# Patient Record
Sex: MALE | Race: WHITE | NOT HISPANIC OR LATINO | Employment: UNEMPLOYED | URBAN - METROPOLITAN AREA
[De-identification: names, ages, dates, MRNs, and addresses within clinical notes are randomized per-mention and may not be internally consistent; named-entity substitution may affect disease eponyms.]

---

## 2017-03-27 ENCOUNTER — HOSPITAL ENCOUNTER (EMERGENCY)
Facility: MEDICAL CENTER | Age: 32
End: 2017-03-27
Attending: EMERGENCY MEDICINE
Payer: MEDICAID

## 2017-03-27 VITALS
TEMPERATURE: 98.2 F | HEART RATE: 93 BPM | WEIGHT: 156.75 LBS | BODY MASS INDEX: 24.6 KG/M2 | RESPIRATION RATE: 14 BRPM | OXYGEN SATURATION: 100 % | SYSTOLIC BLOOD PRESSURE: 140 MMHG | HEIGHT: 67 IN | DIASTOLIC BLOOD PRESSURE: 87 MMHG

## 2017-03-27 DIAGNOSIS — H10.33 ACUTE CONJUNCTIVITIS OF BOTH EYES, UNSPECIFIED ACUTE CONJUNCTIVITIS TYPE: ICD-10-CM

## 2017-03-27 PROCEDURE — 99283 EMERGENCY DEPT VISIT LOW MDM: CPT

## 2017-03-27 RX ORDER — CIPROFLOXACIN HYDROCHLORIDE 3.5 MG/ML
1 SOLUTION/ DROPS TOPICAL
Qty: 1 BOTTLE | Refills: 0 | Status: SHIPPED | OUTPATIENT
Start: 2017-03-27 | End: 2018-01-24

## 2017-03-27 ASSESSMENT — LIFESTYLE VARIABLES: DO YOU DRINK ALCOHOL: NO

## 2017-03-27 ASSESSMENT — PAIN SCALES - GENERAL: PAINLEVEL_OUTOF10: 1

## 2017-03-27 NOTE — ED AVS SNAPSHOT
Goodybag Access Code: [email or web address]  Expires: 4/26/2017  9:30 AM    Goodybag  A secure, online tool to manage your health information     [employer]’s Goodybag® is a secure, online tool that connects you to your personalized health information from the privacy of your home -- day or night - making it very easy for you to manage your healthcare. Once the activation process is completed, you can even access your medical information using the Goodybag sheela, which is available for free in the Apple Sheela store or Google Play store.     Goodybag provides the following levels of access (as shown below):   My Chart Features   [facility] Primary Care Doctor [facility]  Specialists [facility]  Urgent  Care Non-[facility]  Primary Care  Doctor   Email your healthcare team securely and privately 24/7 X X X X   Manage appointments: schedule your next appointment; view details of past/upcoming appointments X      Request prescription refills. X      View recent personal medical records, including lab and immunizations X X X X   View health record, including health history, allergies, medications X X X X   Read reports about your outpatient visits, procedures, consult and ER notes X X X X   See your discharge summary, which is a recap of your hospital and/or ER visit that includes your diagnosis, lab results, and care plan. X X       How to register for Goodybag:  1. Go to  [email or web address].  2. Click on the Sign Up Now box, which takes you to the New Member Sign Up page. You will need to provide the following information:  a. Enter your Goodybag Access Code exactly as it appears at the top of this page. (You will not need to use this code after you’ve completed the sign-up process. If you do not sign up before the expiration date, you must request a new code.)   b. Enter your date of birth.   c. Enter your home email address.   d. Click Submit, and follow the next screen’s instructions.  3. Create a Goodybag ID. This will be your Goodybag  login ID and cannot be changed, so think of one that is secure and easy to remember.  4. Create a NotesFirst password. You can change your password at any time.  5. Enter your Password Reset Question and Answer. This can be used at a later time if you forget your password.   6. Enter your e-mail address. This allows you to receive e-mail notifications when new information is available in NotesFirst.  7. Click Sign Up. You can now view your health information.    For assistance activating your NotesFirst account, call [phone]

## 2017-03-27 NOTE — ED AVS SNAPSHOT
3/27/2017          Sandeep Watson  [address]  [place] [address]    Dear Sandeep:    [facility] wants to ensure your discharge home is safe and you or your loved ones have had all your questions answered regarding your care after you leave the hospital.    You may receive a telephone call within two days of your discharge.  This call is to make certain you understand your discharge instructions as well as ensure we provided you with the best care possible during your stay with us.     The call will only last approximately 3-5 minutes and will be done by a nurse.    Once again, we want to ensure your discharge home is safe and that you have a clear understanding of any next steps in your care.  If you have any questions or concerns, please do not hesitate to contact us, we are here for you.  Thank you for choosing [facility] for your healthcare needs.    Sincerely,    Sidney Pena    [facility]

## 2017-03-27 NOTE — DISCHARGE INSTRUCTIONS
"Conjunctivitis  Conjunctivitis is commonly called \"pink eye.\" Conjunctivitis can be caused by bacterial or viral infection, allergies, or injuries. There is usually redness of the lining of the eye, itching, discomfort, and sometimes discharge. There may be deposits of matter along the eyelids. A viral infection usually causes a watery discharge, while a bacterial infection causes a yellowish, thick discharge. Pink eye is very contagious and spreads by direct contact.  You may be given antibiotic eyedrops as part of your treatment. Before using your eye medicine, remove all drainage from the eye by washing gently with warm water and cotton balls. Continue to use the medication until you have awakened 2 mornings in a row without discharge from the eye. Do not rub your eye. This increases the irritation and helps spread infection. Use separate towels from other household members. Wash your hands with soap and water before and after touching your eyes. Use cold compresses to reduce pain and sunglasses to relieve irritation from light. Do not wear contact lenses or wear eye makeup until the infection is gone.  SEEK MEDICAL CARE IF:   · Your symptoms are not better after 3 days of treatment.  · You have increased pain or trouble seeing.  · The outer eyelids become very red or swollen.  Document Released: 01/25/2006 Document Revised: 03/11/2013 Document Reviewed: 12/18/2006  Spredfast® Patient Information ©2014 [employer].    "

## 2017-03-27 NOTE — ED PROVIDER NOTES
"ED Provider Note    Scribed for Guero Lau M.D. by Víctor Crum. 3/27/2017, 9:15 AM.    Primary care provider: Pcp Pt States None  Means of arrival: walk in  History obtained from: Patient  History limited by: None    CHIEF COMPLAINT  Chief Complaint   Patient presents with   • Conjunctivitis       HPI  Sandeep Watson is a 31 y.o. male who presents to the Emergency Department with red eye for the last 5 days. Patient reports assocaited itching. Patient accompanied to the ED by his mother in law who has similar symptoms. He denies cough, cold, runny nose.     REVIEW OF SYSTEMS  Pertinent positives include red eye.   Pertinent negatives include no cough, cold, rhinorrhea.  No change in visual acuity.  E.     PAST MEDICAL HISTORY       SURGICAL HISTORY  patient denies any surgical history    SOCIAL HISTORY  Social History   Substance Use Topics   • Smoking status: Current Every Day Smoker -- 1.00 packs/day for 17 years     Types: Cigarettes   • Smokeless tobacco: None noted   • Alcohol Use: Yes      Comment: occ      History   Drug Use No     Patient accompanied to the ED by his mother in law who has similar symptoms.     FAMILY HISTORY  None noted    CURRENT MEDICATIONS  No current facility-administered medications for this encounter.    Current outpatient prescriptions:   •  ciprofloxacin (CILOXIN) 0.3 % Solution, Place 1 Drop in both eyes every 2 hours., Disp: 1 Bottle, Rfl: 0  •  ibuprofen (MOTRIN) 200 MG Tab, Take 1 Tab by mouth every 6 hours as needed for Mild Pain., Disp: 30 Tab, Rfl: 3    ALLERGIES  No Known Allergies    PHYSICAL EXAM  VITAL SIGNS: /87 mmHg  Pulse 93  Temp(Src) 36.8 °C (98.2 °F) (Temporal)  Resp 14  Ht 1.702 m (5' 7\")  Wt 71.1 kg (156 lb 12 oz)  BMI 24.54 kg/m2  SpO2 100%    Nursing note and vitals reviewed.  Constitutional: No distress.   HENT: Head is atraumatic. Oropharynx is moist.   Eyes: Pupils are equal, round, and reactive to light. Bilateral injected conjunctiva. " Copious clear watery discharge.   Cardiovascular: Normal peripheral perfusion  Respiratory: No respiratory distress.   Musculoskeletal: Normal range of motion.   Neurological: Alert. No focal deficits noted.    Skin: No rash.   Psych: Appropriate for clinical situation     DIAGNOSTIC STUDIES / PROCEDURES    COURSE & MEDICAL DECISION MAKING  Nursing notes, VS, PMSFHx reviewed in chart.    9:15 AM - Patient seen and examined at bedside. Instructed patient on proper use of Ciloxin 0.3% for his symptoms. Patient verbalizes understanding and agrees to discharge.     DISPOSITION:  Patient will be discharged home in stable condition.    FOLLOW UP:  [facility], Emergency Dept  [address] [place] [id number] [phone]    If symptoms worsen      OUTPATIENT MEDICATIONS:  Discharge Medication List as of 3/27/2017  9:30 AM      START taking these medications    Details   ciprofloxacin (CILOXIN) 0.3 % Solution Place 1 Drop in both eyes every 2 hours., Disp-1 Bottle, R-0, Print Rx Paper             The patient was discharged home with an information sheet on conjunctivitis and told to return immediately for any signs or symptoms listed.  The patient agreed to the discharge precautions and follow-up plan which is documented in EPIC.    FINAL IMPRESSION  1. Acute conjunctivitis of both eyes, unspecified acute conjunctivitis type         The note accurately reflects work and decisions made by me.  Guero Lau  3/27/2017  3:48 PM

## 2017-03-27 NOTE — ED NOTES
Discharge instructions given, pt verbalized understanding.  Prescription instructions given, pt verbalized understanding.  A&ox4.  VSS.  Ambulates out of ER.

## 2017-03-27 NOTE — ED AVS SNAPSHOT
Home Care Instructions                                                                                                                Sandeep Watson   MRN: [id number]    Department:  [facility], Emergency Dept   Date of Visit:  3/27/2017            [facility], Emergency Dept    [address] [place] [id number]    Phone:  [phone]      You were seen by     Guero Lau M.D.      Your Diagnosis Was     Acute conjunctivitis of both eyes, unspecified acute conjunctivitis type     H10.33       Follow-up Information     1. Follow up with [facility], Emergency Dept.    Specialty:  Emergency Medicine    Why:  If symptoms worsen    Contact information    [address] [place] [id number] [phone]      Medication Information     Review all of your home medications and newly ordered medications with your primary doctor and/or pharmacist as soon as possible. Follow medication instructions as directed by your doctor and/or pharmacist.     Please keep your complete medication list with you and share with your physician. Update the information when medications are discontinued, doses are changed, or new medications (including over-the-counter products) are added; and carry medication information at all times in the event of emergency situations.               Medication List      START taking these medications        Instructions    Morning Afternoon Evening Bedtime    ciprofloxacin 0.3 % Soln   Commonly known as:  CILOXIN        Place 1 Drop in both eyes every 2 hours.   Dose:  1 Drop                          ASK your doctor about these medications        Instructions    Morning Afternoon Evening Bedtime    ibuprofen 200 MG Tabs   Commonly known as:  MOTRIN        Take 1 Tab by mouth every 6 hours as needed for Mild Pain.   Dose:  200 mg                             Where to Get Your Medications      You can get these medications from any pharmacy    "   Bring a paper prescription for each of these medications    - ciprofloxacin 0.3 % Soln              Discharge Instructions       Conjunctivitis  Conjunctivitis is commonly called \"pink eye.\" Conjunctivitis can be caused by bacterial or viral infection, allergies, or injuries. There is usually redness of the lining of the eye, itching, discomfort, and sometimes discharge. There may be deposits of matter along the eyelids. A viral infection usually causes a watery discharge, while a bacterial infection causes a yellowish, thick discharge. Pink eye is very contagious and spreads by direct contact.  You may be given antibiotic eyedrops as part of your treatment. Before using your eye medicine, remove all drainage from the eye by washing gently with warm water and cotton balls. Continue to use the medication until you have awakened 2 mornings in a row without discharge from the eye. Do not rub your eye. This increases the irritation and helps spread infection. Use separate towels from other household members. Wash your hands with soap and water before and after touching your eyes. Use cold compresses to reduce pain and sunglasses to relieve irritation from light. Do not wear contact lenses or wear eye makeup until the infection is gone.  SEEK MEDICAL CARE IF:   · Your symptoms are not better after 3 days of treatment.  · You have increased pain or trouble seeing.  · The outer eyelids become very red or swollen.  Document Released: 01/25/2006 Document Revised: 03/11/2013 Document Reviewed: 12/18/2006  ExitCare® Patient Information ©2014 [employer].            Patient Information     Patient Information    Following emergency treatment: all patient requiring follow-up care must return either to a private physician or a clinic if your condition worsens before you are able to obtain further medical attention, please return to the emergency room.     Billing Information    At [employer], we work to make the billing " process streamlined for our patients.  Our Representatives are here to answer any questions you may have regarding your hospital bill.  If you have insurance coverage and have supplied your insurance information to us, we will submit a claim to your insurer on your behalf.  Should you have any questions regarding your bill, we can be reached online or by phone as follows:  Online: You are able pay your bills online or live chat with our representatives about any billing questions you may have. We are here to help Monday - Friday from 8:00am to 7:30pm and 9:00am - 12:00pm on Saturdays.  Please visit [email or web address]  for more information.   Phone:  [phone] or [phone]    Please note that your emergency physician, surgeon, pathologist, radiologist, anesthesiologist, and other specialists are not employed by [facility] and will therefore bill separately for their services.  Please contact them directly for any questions concerning their bills at the numbers below:     Emergency Physician Services:  [phone]  [facility]:  [phone]  [facility]:  [phone]  [facility]:  [phone]    1. Your final bill may vary from the amount quoted upon discharge if all procedures are not complete at that time, or if your doctor has additional procedures of which we are not aware. You will receive an additional bill if you return to the Emergency Department at [facility] for suture removal regardless of the facility of which the sutures were placed.     2. Please arrange for settlement of this account at the emergency registration.    3. All self-pay accounts are due in full at the time of treatment.  If you are unable to meet this obligation then payment is expected within 4-5 days.     4. If you have had radiology studies (CT, X-ray, Ultrasound, MRI), you have received a preliminary result during your emergency department  visit. Please contact the radiology department [phone] to receive a copy of your final result. Please discuss the Final result with your primary physician or with the follow up physician provided.     Crisis Hotline:  [place] Crisis Hotline:  6-913-LFMTUYR or 1-276.259.4354  [place] Crisis Hotline:    1-438.185.4347 or 616-442-7544         ED Discharge Follow Up Questions    1. In order to provide you with very good care, we would like to follow up with a phone call in the next few days.  May we have your permission to contact you?     YES /  NO    2. What is the best phone number to call you? (       )_____-__________    3. What is the best time to call you?      Morning  /  Afternoon  /  Evening                   Patient Signature:  ____________________________________________________________    Date:  ____________________________________________________________

## 2018-01-24 ENCOUNTER — HOSPITAL ENCOUNTER (EMERGENCY)
Facility: MEDICAL CENTER | Age: 33
End: 2018-01-24
Attending: EMERGENCY MEDICINE
Payer: MEDICAID

## 2018-01-24 ENCOUNTER — APPOINTMENT (OUTPATIENT)
Dept: RADIOLOGY | Facility: MEDICAL CENTER | Age: 33
End: 2018-01-24
Attending: EMERGENCY MEDICINE
Payer: MEDICAID

## 2018-01-24 VITALS
OXYGEN SATURATION: 96 % | BODY MASS INDEX: 25.03 KG/M2 | SYSTOLIC BLOOD PRESSURE: 131 MMHG | TEMPERATURE: 98.2 F | HEART RATE: 84 BPM | RESPIRATION RATE: 20 BRPM | WEIGHT: 159.83 LBS | DIASTOLIC BLOOD PRESSURE: 72 MMHG

## 2018-01-24 DIAGNOSIS — S09.93XA DENTAL INJURY, INITIAL ENCOUNTER: ICD-10-CM

## 2018-01-24 DIAGNOSIS — S01.81XA FACIAL LACERATION, INITIAL ENCOUNTER: ICD-10-CM

## 2018-01-24 PROCEDURE — 70486 CT MAXILLOFACIAL W/O DYE: CPT

## 2018-01-24 PROCEDURE — 304999 HCHG REPAIR-SIMPLE/INTERMED LEVEL 1

## 2018-01-24 PROCEDURE — 99284 EMERGENCY DEPT VISIT MOD MDM: CPT

## 2018-01-24 PROCEDURE — 90471 IMMUNIZATION ADMIN: CPT

## 2018-01-24 PROCEDURE — 700102 HCHG RX REV CODE 250 W/ 637 OVERRIDE(OP): Performed by: EMERGENCY MEDICINE

## 2018-01-24 PROCEDURE — A9270 NON-COVERED ITEM OR SERVICE: HCPCS | Performed by: EMERGENCY MEDICINE

## 2018-01-24 PROCEDURE — 70450 CT HEAD/BRAIN W/O DYE: CPT

## 2018-01-24 PROCEDURE — 90715 TDAP VACCINE 7 YRS/> IM: CPT | Performed by: EMERGENCY MEDICINE

## 2018-01-24 PROCEDURE — 700111 HCHG RX REV CODE 636 W/ 250 OVERRIDE (IP): Performed by: EMERGENCY MEDICINE

## 2018-01-24 PROCEDURE — 303747 HCHG EXTRA SUTURE

## 2018-01-24 PROCEDURE — 700101 HCHG RX REV CODE 250: Performed by: EMERGENCY MEDICINE

## 2018-01-24 RX ORDER — AMOXICILLIN 500 MG/1
500 CAPSULE ORAL 3 TIMES DAILY
Qty: 21 CAP | Refills: 0 | Status: SHIPPED | OUTPATIENT
Start: 2018-01-24 | End: 2018-01-31

## 2018-01-24 RX ORDER — HYDROCODONE BITARTRATE AND ACETAMINOPHEN 5; 325 MG/1; MG/1
1 TABLET ORAL EVERY 6 HOURS PRN
Qty: 5 TAB | Refills: 0 | Status: SHIPPED | OUTPATIENT
Start: 2018-01-24 | End: 2018-01-26

## 2018-01-24 RX ORDER — HYDROCODONE BITARTRATE AND ACETAMINOPHEN 5; 325 MG/1; MG/1
1 TABLET ORAL ONCE
Status: COMPLETED | OUTPATIENT
Start: 2018-01-24 | End: 2018-01-24

## 2018-01-24 RX ORDER — AMOXICILLIN 500 MG/1
500 CAPSULE ORAL ONCE
Status: COMPLETED | OUTPATIENT
Start: 2018-01-24 | End: 2018-01-24

## 2018-01-24 RX ORDER — LIDOCAINE HYDROCHLORIDE AND EPINEPHRINE BITARTRATE 20; .01 MG/ML; MG/ML
10 INJECTION, SOLUTION SUBCUTANEOUS ONCE
Status: COMPLETED | OUTPATIENT
Start: 2018-01-24 | End: 2018-01-24

## 2018-01-24 RX ADMIN — LIDOCAINE HYDROCHLORIDE AND EPINEPHRINE 10 ML: 20; 10 INJECTION, SOLUTION INFILTRATION; PERINEURAL at 12:55

## 2018-01-24 RX ADMIN — HYDROCODONE BITARTRATE AND ACETAMINOPHEN 1 TABLET: 5; 325 TABLET ORAL at 13:17

## 2018-01-24 RX ADMIN — AMOXICILLIN 500 MG: 500 CAPSULE ORAL at 13:17

## 2018-01-24 RX ADMIN — CLOSTRIDIUM TETANI TOXOID ANTIGEN (FORMALDEHYDE INACTIVATED), CORYNEBACTERIUM DIPHTHERIAE TOXOID ANTIGEN (FORMALDEHYDE INACTIVATED), BORDETELLA PERTUSSIS TOXOID ANTIGEN (GLUTARALDEHYDE INACTIVATED), BORDETELLA PERTUSSIS FILAMENTOUS HEMAGGLUTININ ANTIGEN (FORMALDEHYDE INACTIVATED), BORDETELLA PERTUSSIS PERTACTIN ANTIGEN, AND BORDETELLA PERTUSSIS FIMBRIAE 2/3 ANTIGEN 0.5 ML: 5; 2; 2.5; 5; 3; 5 INJECTION, SUSPENSION INTRAMUSCULAR at 12:19
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
## 2018-01-24 NOTE — DISCHARGE INSTRUCTIONS
Laceration Care, Adult  A laceration is a cut that goes through all of the layers of the skin and into the tissue that is right under the skin. Some lacerations heal on their own. Others need to be closed with stitches (sutures), staples, skin adhesive strips, or skin glue. Proper laceration care minimizes the risk of infection and helps the laceration to heal better.  HOW TO CARE FOR YOUR LACERATION  If sutures or staples were used:  · Keep the wound clean and dry.  · If you were given a bandage (dressing), you should change it at least one time per day or as told by your health care provider. You should also change it if it becomes wet or dirty.  · Keep the wound completely dry for the first 24 hours or as told by your health care provider. After that time, you may shower or bathe. However, make sure that the wound is not soaked in water until after the sutures or staples have been removed.  · Clean the wound one time each day or as told by your health care provider:  ¨ Wash the wound with soap and water.  ¨ Rinse the wound with water to remove all soap.  ¨ Pat the wound dry with a clean towel. Do not rub the wound.  · After cleaning the wound, apply a thin layer of antibiotic ointment as told by your health care provider. This will help to prevent infection and keep the dressing from sticking to the wound.  · Have the sutures or staples removed as told by your health care provider.  If skin adhesive strips were used:  · Keep the wound clean and dry.  · If you were given a bandage (dressing), you should change it at least one time per day or as told by your health care provider. You should also change it if it becomes dirty or wet.  · Do not get the skin adhesive strips wet. You may shower or bathe, but be careful to keep the wound dry.  · If the wound gets wet, pat it dry with a clean towel. Do not rub the wound.  · Skin adhesive strips fall off on their own. You may trim the strips as the wound heals. Do not  remove skin adhesive strips that are still stuck to the wound. They will fall off in time.  If skin glue was used:  · Try to keep the wound dry, but you may briefly wet it in the shower or bath. Do not soak the wound in water, such as by swimming.  · After you have showered or bathed, gently pat the wound dry with a clean towel. Do not rub the wound.  · Do not do any activities that will make you sweat heavily until the skin glue has fallen off on its own.  · Do not apply liquid, cream, or ointment medicine to the wound while the skin glue is in place. Using those may loosen the film before the wound has healed.  · If you were given a bandage (dressing), you should change it at least one time per day or as told by your health care provider. You should also change it if it becomes dirty or wet.  · If a dressing is placed over the wound, be careful not to apply tape directly over the skin glue. Doing that may cause the glue to be pulled off before the wound has healed.  · Do not pick at the glue. The skin glue usually remains in place for 5-10 days, then it falls off of the skin.  General Instructions  · Take over-the-counter and prescription medicines only as told by your health care provider.  · If you were prescribed an antibiotic medicine or ointment, take or apply it as told by your doctor. Do not stop using it even if your condition improves.  · To help prevent scarring, make sure to cover your wound with sunscreen whenever you are outside after stitches are removed, after adhesive strips are removed, or when glue remains in place and the wound is healed. Make sure to wear a sunscreen of at least 30 SPF.  · Do not scratch or pick at the wound.  · Keep all follow-up visits as told by your health care provider. This is important.  · Check your wound every day for signs of infection. Watch for:  ¨ Redness, swelling, or pain.  ¨ Fluid, blood, or pus.  · Raise (elevate) the injured area above the level of your heart  while you are sitting or lying down, if possible.  SEEK MEDICAL CARE IF:  · You received a tetanus shot and you have swelling, severe pain, redness, or bleeding at the injection site.  · You have a fever.  · A wound that was closed breaks open.  · You notice a bad smell coming from your wound or your dressing.  · You notice something coming out of the wound, such as wood or glass.  · Your pain is not controlled with medicine.  · You have increased redness, swelling, or pain at the site of your wound.  · You have fluid, blood, or pus coming from your wound.  · You notice a change in the color of your skin near your wound.  · You need to change the dressing frequently due to fluid, blood, or pus draining from the wound.  · You develop a new rash.  · You develop numbness around the wound.  SEEK IMMEDIATE MEDICAL CARE IF:  · You develop severe swelling around the wound.  · Your pain suddenly increases and is severe.  · You develop painful lumps near the wound or on skin that is anywhere on your body.  · You have a red streak going away from your wound.  · The wound is on your hand or foot and you cannot properly move a finger or toe.  · The wound is on your hand or foot and you notice that your fingers or toes look pale or bluish.     This information is not intended to replace advice given to you by your health care provider. Make sure you discuss any questions you have with your health care provider.     Document Released: 12/18/2006 Document Revised: 05/03/2016 Document Reviewed: 12/14/2015  [employer] Patient Education ©2016 [employer]
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
## 2019-03-12 ENCOUNTER — HOSPITAL ENCOUNTER (EMERGENCY)
Facility: MEDICAL CENTER | Age: 34
End: 2019-03-12
Attending: EMERGENCY MEDICINE
Payer: MEDICAID

## 2019-03-12 VITALS
HEART RATE: 83 BPM | SYSTOLIC BLOOD PRESSURE: 132 MMHG | OXYGEN SATURATION: 95 % | HEIGHT: 67 IN | DIASTOLIC BLOOD PRESSURE: 94 MMHG | BODY MASS INDEX: 25.92 KG/M2 | TEMPERATURE: 97.7 F | RESPIRATION RATE: 16 BRPM | WEIGHT: 165.12 LBS

## 2019-03-12 DIAGNOSIS — T30.0 BURN: ICD-10-CM

## 2019-03-12 DIAGNOSIS — L03.115 CELLULITIS OF RIGHT LOWER EXTREMITY: ICD-10-CM

## 2019-03-12 PROCEDURE — 700102 HCHG RX REV CODE 250 W/ 637 OVERRIDE(OP): Performed by: EMERGENCY MEDICINE

## 2019-03-12 PROCEDURE — 99284 EMERGENCY DEPT VISIT MOD MDM: CPT

## 2019-03-12 PROCEDURE — A9270 NON-COVERED ITEM OR SERVICE: HCPCS | Performed by: EMERGENCY MEDICINE

## 2019-03-12 RX ORDER — HYDROCODONE BITARTRATE AND ACETAMINOPHEN 10; 325 MG/1; MG/1
1 TABLET ORAL ONCE
Status: COMPLETED | OUTPATIENT
Start: 2019-03-12 | End: 2019-03-12

## 2019-03-12 RX ORDER — SULFAMETHOXAZOLE AND TRIMETHOPRIM 800; 160 MG/1; MG/1
1 TABLET ORAL EVERY 12 HOURS
Qty: 14 TAB | Refills: 0 | Status: SHIPPED | OUTPATIENT
Start: 2019-03-12 | End: 2019-03-19

## 2019-03-12 RX ORDER — SULFAMETHOXAZOLE AND TRIMETHOPRIM 800; 160 MG/1; MG/1
2 TABLET ORAL ONCE
Status: COMPLETED | OUTPATIENT
Start: 2019-03-12 | End: 2019-03-12

## 2019-03-12 RX ORDER — IBUPROFEN 600 MG/1
600 TABLET ORAL ONCE
Status: COMPLETED | OUTPATIENT
Start: 2019-03-12 | End: 2019-03-12

## 2019-03-12 RX ADMIN — IBUPROFEN 600 MG: 600 TABLET ORAL at 15:21

## 2019-03-12 RX ADMIN — SULFAMETHOXAZOLE AND TRIMETHOPRIM 2 TABLET: 800; 160 TABLET ORAL at 15:21

## 2019-03-12 RX ADMIN — HYDROCODONE BITARTRATE AND ACETAMINOPHEN 1 TABLET: 10; 325 TABLET ORAL at 15:21

## 2019-03-12 NOTE — ED NOTES
Pt discharged with one paper prescription; Pt verbalized understanding of education and medication information. Pt ambulated to lobby with steady gait.
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
## 2019-10-11 ENCOUNTER — HOSPITAL ENCOUNTER (EMERGENCY)
Facility: MEDICAL CENTER | Age: 34
End: 2019-10-11
Attending: EMERGENCY MEDICINE
Payer: MEDICAID

## 2019-10-11 VITALS
RESPIRATION RATE: 20 BRPM | TEMPERATURE: 97.5 F | HEART RATE: 96 BPM | BODY MASS INDEX: 24.84 KG/M2 | OXYGEN SATURATION: 97 % | SYSTOLIC BLOOD PRESSURE: 129 MMHG | HEIGHT: 67 IN | DIASTOLIC BLOOD PRESSURE: 81 MMHG | WEIGHT: 158.29 LBS

## 2019-10-11 DIAGNOSIS — L03.113 CELLULITIS OF RIGHT UPPER EXTREMITY: ICD-10-CM

## 2019-10-11 LAB
ALBUMIN SERPL BCP-MCNC: 3.8 G/DL (ref 3.2–4.9)
ALBUMIN/GLOB SERPL: 1 G/DL
ALP SERPL-CCNC: 89 U/L (ref 30–99)
ALT SERPL-CCNC: 17 U/L (ref 2–50)
ANION GAP SERPL CALC-SCNC: 10 MMOL/L (ref 0–11.9)
AST SERPL-CCNC: 19 U/L (ref 12–45)
BASOPHILS # BLD AUTO: 0.6 % (ref 0–1.8)
BASOPHILS # BLD: 0.06 K/UL (ref 0–0.12)
BILIRUB SERPL-MCNC: 0.4 MG/DL (ref 0.1–1.5)
BLOOD CULTURE HOLD CXBCH: NORMAL
BUN SERPL-MCNC: 15 MG/DL (ref 8–22)
CALCIUM SERPL-MCNC: 9 MG/DL (ref 8.5–10.5)
CHLORIDE SERPL-SCNC: 103 MMOL/L (ref 96–112)
CO2 SERPL-SCNC: 23 MMOL/L (ref 20–33)
CREAT SERPL-MCNC: 0.97 MG/DL (ref 0.5–1.4)
EOSINOPHIL # BLD AUTO: 0.05 K/UL (ref 0–0.51)
EOSINOPHIL NFR BLD: 0.5 % (ref 0–6.9)
ERYTHROCYTE [DISTWIDTH] IN BLOOD BY AUTOMATED COUNT: 42 FL (ref 35.9–50)
GLOBULIN SER CALC-MCNC: 3.8 G/DL (ref 1.9–3.5)
GLUCOSE SERPL-MCNC: 144 MG/DL (ref 65–99)
HCT VFR BLD AUTO: 40.5 % (ref 42–52)
HGB BLD-MCNC: 13.2 G/DL (ref 14–18)
IMM GRANULOCYTES # BLD AUTO: 0.05 K/UL (ref 0–0.11)
IMM GRANULOCYTES NFR BLD AUTO: 0.5 % (ref 0–0.9)
LACTATE BLD-SCNC: 2 MMOL/L (ref 0.5–2)
LYMPHOCYTES # BLD AUTO: 1.83 K/UL (ref 1–4.8)
LYMPHOCYTES NFR BLD: 17.3 % (ref 22–41)
MCH RBC QN AUTO: 28.3 PG (ref 27–33)
MCHC RBC AUTO-ENTMCNC: 32.6 G/DL (ref 33.7–35.3)
MCV RBC AUTO: 86.9 FL (ref 81.4–97.8)
MONOCYTES # BLD AUTO: 0.62 K/UL (ref 0–0.85)
MONOCYTES NFR BLD AUTO: 5.9 % (ref 0–13.4)
NEUTROPHILS # BLD AUTO: 7.96 K/UL (ref 1.82–7.42)
NEUTROPHILS NFR BLD: 75.2 % (ref 44–72)
NRBC # BLD AUTO: 0 K/UL
NRBC BLD-RTO: 0 /100 WBC
PLATELET # BLD AUTO: 497 K/UL (ref 164–446)
PMV BLD AUTO: 8.1 FL (ref 9–12.9)
POTASSIUM SERPL-SCNC: 4 MMOL/L (ref 3.6–5.5)
PROT SERPL-MCNC: 7.6 G/DL (ref 6–8.2)
RBC # BLD AUTO: 4.66 M/UL (ref 4.7–6.1)
SODIUM SERPL-SCNC: 136 MMOL/L (ref 135–145)
WBC # BLD AUTO: 10.6 K/UL (ref 4.8–10.8)

## 2019-10-11 PROCEDURE — 80053 COMPREHEN METABOLIC PANEL: CPT

## 2019-10-11 PROCEDURE — 96365 THER/PROPH/DIAG IV INF INIT: CPT

## 2019-10-11 PROCEDURE — 85025 COMPLETE CBC W/AUTO DIFF WBC: CPT

## 2019-10-11 PROCEDURE — 99284 EMERGENCY DEPT VISIT MOD MDM: CPT

## 2019-10-11 PROCEDURE — 700105 HCHG RX REV CODE 258: Performed by: EMERGENCY MEDICINE

## 2019-10-11 PROCEDURE — 83605 ASSAY OF LACTIC ACID: CPT

## 2019-10-11 PROCEDURE — 700111 HCHG RX REV CODE 636 W/ 250 OVERRIDE (IP): Performed by: EMERGENCY MEDICINE

## 2019-10-11 RX ORDER — CEPHALEXIN 500 MG/1
500 CAPSULE ORAL 3 TIMES DAILY
Qty: 21 CAP | Refills: 0 | Status: SHIPPED | OUTPATIENT
Start: 2019-10-11 | End: 2019-10-18

## 2019-10-11 RX ORDER — SULFAMETHOXAZOLE AND TRIMETHOPRIM 800; 160 MG/1; MG/1
1 TABLET ORAL 2 TIMES DAILY
Qty: 14 TAB | Refills: 0 | Status: SHIPPED | OUTPATIENT
Start: 2019-10-11 | End: 2019-10-18

## 2019-10-11 RX ADMIN — AMPICILLIN AND SULBACTAM 3 G: 2; 1 INJECTION, POWDER, FOR SOLUTION INTRAVENOUS at 15:47

## 2019-10-11 ASSESSMENT — LIFESTYLE VARIABLES
HAVE YOU EVER FELT YOU SHOULD CUT DOWN ON YOUR DRINKING: NO
CONSUMPTION TOTAL: INCOMPLETE
EVER HAD A DRINK FIRST THING IN THE MORNING TO STEADY YOUR NERVES TO GET RID OF A HANGOVER: NO
HAVE PEOPLE ANNOYED YOU BY CRITICIZING YOUR DRINKING: NO
TOTAL SCORE: 0
TOTAL SCORE: 0
EVER FELT BAD OR GUILTY ABOUT YOUR DRINKING: NO
DO YOU DRINK ALCOHOL: NO
TOTAL SCORE: 0

## 2019-10-11 NOTE — ED TRIAGE NOTES
.  Chief Complaint   Patient presents with   • Skin Lesion     right wrist x 2 days swelling x 1 days   • Arm Swelling     Ambulated to triage, pt reports possible bug bite 2 days ago. Area red swollen. Denies fever.

## 2019-10-11 NOTE — ED PROVIDER NOTES
ED Provider Note    HPI: Patient is a 33-year-old male who presented to the emergency department October 11, 2019 at 2:12 PM with a chief complaint of a right wrist lesion and right arm swelling.    2 days ago the patient noted a small lesion on the dorsal aspect of his right forearm.  This is gotten gradually worse and he said a little bit of yellowish drainage out of it.  Today noted his arm was moderately swollen.  He has not had a fever chills or cough no nausea or vomiting.  He does not know if this represents an infected insect bite.  No chest pain no shortness of breath.  No numbness or tingling of the affected extremity.  No other somatic complaint    Review of Systems: As for lesion on right distal forearm dorsal aspect with right arm swelling negative for fever numbness tingling chills cough nausea vomiting chest pain shortness of breath.  Review of systems reviewed with patient, all other systems negative    Past medical/surgical history: Conjunctivitis    Medications: Remote    Allergies: None    Social History: Patient smokes 1 pack cigarettes per day occasional use of alcohol      Physical exam: Constitutional: Pleasant male awake alert  Vital signs: Temperature 97.5 pulse 126 respirations 18 blood pressure 142/93 /77 pulse oximetry 97%  EYES: PERRL, EOMI, Conjunctivae and sclera normal, eyelids normal bilaterally.  Neck: Trachea midline. No cervical masses seen or palpated. Normal range of motion, supple. No meningeal signs elicited.  Cardiac: Regular rate and rhythm. S1-S2 present. No S3 or S4 present. No murmurs, rubs, or gallops heard. No edema or varicosities were seen.   Lungs: Clear to auscultation with good aeration throughout. No wheezes, rales, or rhonchi heard. Patient's chest wall moved symmetrically with each respiratory effort. Patient was not making use of accessory muscles of respiration in breathing.  Abdomen: Soft nontender to palpation. No rebound or guarding elicited. No  organomegaly identified. No pulsatile abdominal masses identified.   Musculoskeletal: Right upper extremity is moderately swollen from the wrist to the elbow region.  No  pain with palpitation or movement of muscle, bone or joint , no obvious musculoskeletal deformities identified.  Neurologic: alert and awake answers questions appropriately. Moves all four extremities independently, no gross focal abnormalities identified. Normal strength and motor.  Skin: Ulcerated lesion dorsal aspect right forearm appears to be infectious.  Psychiatric: not anxious, delusional, or hallucinating.    Medical decision making: Patient was tachycardic on arrival and had evidence of infection.  He was given a liter point and normal saline ppears protocol for possible sepsis.     laboratory studies obtained (please see lab sheet for all results) significant findings included white count of 10.6.  There is a preponderance of neutrophils with 25.2% neutrophils and 7.96 absolute neutrophils.  However the immature granulocyte count is normal at 0.5 making systemic infection less likely.  Lactic acid normal at 2.0 chemistry panel showed mild hyperglycemia blood sugar 144.    Patient given 3 g Unasyn IV.  Repeat vital signs checked, heart rate was now below 100.  Patient said he felt somewhat better.    Patient discharged on Bactrim and Keflex.  Patient will return tomorrow for recheck.  At this time the patient has no signs or symptoms of a systemic infection given his laboratory results and improved vital signs but I am concerned that this may worsen despite appropriate antibiotic treatment.  The patient verbalized understanding of the importance of returning states he will do so.  Patient given discharge instructions for cellulitis.  Patient also was told to return immediately for fever vomiting increasing pain or any other problems    Impression right upper extremity cellulitis

## 2019-10-11 NOTE — ED NOTES
Pt IV fluids completed at this time.  No S&S of infiltration, reactions, or overload.  Will continue to monitor.

## 2020-01-01 ENCOUNTER — HOSPITAL ENCOUNTER (EMERGENCY)
Facility: MEDICAL CENTER | Age: 35
End: 2020-01-01
Attending: EMERGENCY MEDICINE
Payer: MEDICAID

## 2020-01-01 VITALS
SYSTOLIC BLOOD PRESSURE: 156 MMHG | BODY MASS INDEX: 25.95 KG/M2 | HEART RATE: 98 BPM | RESPIRATION RATE: 20 BRPM | WEIGHT: 165.34 LBS | TEMPERATURE: 96.8 F | HEIGHT: 67 IN | OXYGEN SATURATION: 96 % | DIASTOLIC BLOOD PRESSURE: 88 MMHG

## 2020-01-01 DIAGNOSIS — L02.91 ABSCESS: ICD-10-CM

## 2020-01-01 DIAGNOSIS — L03.114 CELLULITIS OF LEFT UPPER EXTREMITY: ICD-10-CM

## 2020-01-01 PROCEDURE — 99283 EMERGENCY DEPT VISIT LOW MDM: CPT

## 2020-01-01 PROCEDURE — 700102 HCHG RX REV CODE 250 W/ 637 OVERRIDE(OP): Performed by: EMERGENCY MEDICINE

## 2020-01-01 PROCEDURE — A9270 NON-COVERED ITEM OR SERVICE: HCPCS | Performed by: EMERGENCY MEDICINE

## 2020-01-01 RX ORDER — SULFAMETHOXAZOLE AND TRIMETHOPRIM 800; 160 MG/1; MG/1
1 TABLET ORAL 2 TIMES DAILY
Qty: 10 TAB | Refills: 0 | Status: SHIPPED | OUTPATIENT
Start: 2020-01-01 | End: 2020-01-06

## 2020-01-01 RX ORDER — SULFAMETHOXAZOLE AND TRIMETHOPRIM 800; 160 MG/1; MG/1
1 TABLET ORAL ONCE
Status: COMPLETED | OUTPATIENT
Start: 2020-01-01 | End: 2020-01-01

## 2020-01-01 RX ADMIN — SULFAMETHOXAZOLE AND TRIMETHOPRIM 1 TABLET: 800; 160 TABLET ORAL at 23:38

## 2020-01-02 NOTE — ED TRIAGE NOTES
Chief Complaint   Patient presents with   • Open Wound     Pt reports puslike pimple to left wrist 2 days ago, squeezed pimple yesterday/ puslike drainage sqeezed out per pt. Today there is an open wound with redness/ swelling to left wrist. Bruising noted to outside edges of wound.      Pt ambulatory to triage with above complaint. Educated on triage process, encouraged to inform staff of any changes.

## 2020-01-02 NOTE — ED NOTES
Discharge instructions given to pt. Prescriptions handed to pt at bedside. Pt educated, verbalizes understanding. All belongings accounted for. Pt ambulated out of ED with steady gait to go home with friend at side.

## 2020-01-02 NOTE — DISCHARGE INSTRUCTIONS
You were seen in the ED today for a rash.  Your rash appears to be cellulitis, which is an infection of the skin. You have been given a prescription for antibiotics which you will need to continue to take at home. Please take all of the antibiotics as prescribed.     Your infection should begin to improve within 2 days. Your rash may spread a small amount before improving. It should not increase in size more than 25%.    Please apply warm compresses several times a day.  Allow any pus that forms to drain.    Please return to the ED if you have any worsening symptoms, spreading rash, fever, increasing pain, numbness or other concerns.

## 2020-01-11 NOTE — ED PROVIDER NOTES
ED Provider Note    Patient presented to this facility January 10, 2020 at 9 PM requesting a antibiotic prescription replacement.  The patient was seen here several days ago with a hand infection and did not get his prescription filled and managed to lose it.  He states his infection seems to be about the same as previous.  He is had no drainage or fever.  No vomiting.    The patient very much did not wish to be further evaluated he just wanted to have his prescription replaced.  As a courtesy this was done.  The patient declined any further evaluation at this time except for the prescription.  He did agree that he would return to ED for increasing swelling drainage or any other problem

## 2020-01-13 ENCOUNTER — HOSPITAL ENCOUNTER (EMERGENCY)
Facility: MEDICAL CENTER | Age: 35
End: 2020-01-13
Attending: EMERGENCY MEDICINE
Payer: MEDICAID

## 2020-01-13 VITALS
BODY MASS INDEX: 25.19 KG/M2 | HEIGHT: 67 IN | WEIGHT: 160.5 LBS | OXYGEN SATURATION: 97 % | TEMPERATURE: 96.6 F | DIASTOLIC BLOOD PRESSURE: 91 MMHG | HEART RATE: 100 BPM | SYSTOLIC BLOOD PRESSURE: 133 MMHG | RESPIRATION RATE: 16 BRPM

## 2020-01-13 DIAGNOSIS — L98.9 HAND LESION: ICD-10-CM

## 2020-01-13 DIAGNOSIS — L03.119 CELLULITIS OF UPPER EXTREMITY, UNSPECIFIED LATERALITY: ICD-10-CM

## 2020-01-13 PROCEDURE — 700102 HCHG RX REV CODE 250 W/ 637 OVERRIDE(OP): Performed by: EMERGENCY MEDICINE

## 2020-01-13 PROCEDURE — 99283 EMERGENCY DEPT VISIT LOW MDM: CPT

## 2020-01-13 PROCEDURE — A9270 NON-COVERED ITEM OR SERVICE: HCPCS | Performed by: EMERGENCY MEDICINE

## 2020-01-13 RX ORDER — CEPHALEXIN 500 MG/1
500 CAPSULE ORAL ONCE
Status: COMPLETED | OUTPATIENT
Start: 2020-01-13 | End: 2020-01-13

## 2020-01-13 RX ORDER — CEPHALEXIN 500 MG/1
500 CAPSULE ORAL 3 TIMES DAILY
Qty: 21 CAP | Refills: 0 | Status: SHIPPED | OUTPATIENT
Start: 2020-01-13 | End: 2020-01-20

## 2020-01-13 RX ORDER — SULFAMETHOXAZOLE AND TRIMETHOPRIM 800; 160 MG/1; MG/1
1 TABLET ORAL 2 TIMES DAILY
Qty: 14 TAB | Refills: 0 | Status: SHIPPED | OUTPATIENT
Start: 2020-01-13 | End: 2020-01-20

## 2020-01-13 RX ORDER — SULFAMETHOXAZOLE AND TRIMETHOPRIM 800; 160 MG/1; MG/1
1 TABLET ORAL ONCE
Status: COMPLETED | OUTPATIENT
Start: 2020-01-13 | End: 2020-01-13

## 2020-01-13 RX ADMIN — CEPHALEXIN 500 MG: 500 CAPSULE ORAL at 18:10

## 2020-01-13 RX ADMIN — SULFAMETHOXAZOLE AND TRIMETHOPRIM 1 TABLET: 800; 160 TABLET ORAL at 18:10

## 2020-01-14 NOTE — ED NOTES
Left forearm and right hand lesions cleansed w/soap and water, dressed w/bacitracin and bandages.

## 2020-01-14 NOTE — ED PROVIDER NOTES
"                                     ED Provider Note    Scribed for Benedict Watson M.D. by Clarissa Bailey. 1/13/2020  5:44 PM    CHIEF COMPLAINT  Chief Complaint   Patient presents with   • Abscess   • Wound Infection       HPI  Sandeep Watson is a 34 y.o. male who presents for persistent wounds on both hands. Chart review indicate patient was seen here on 1/1/2020 for wounds and was sent home with prescription antibiotics. He notes he has not been compliant with prescriptions because he lost the script. He is currently complaining of pain with movement of his wrist and finger and generalized edema throughout both hands. He states he has been keeping the wound exposed and cleaning it daily with hydrogen peroxide. He denies any fevers or history of immunosuppression and notes he is not currently taking steroids.     REVIEW OF SYSTEMS  Constitutional: No fevers  Skin: Wounds on both hands with edema  See HPI for further details.      PAST MEDICAL HISTORY   None noted.     SOCIAL HISTORY  Social History     Tobacco Use   • Smoking status: Current Every Day Smoker     Packs/day: 1.00     Years: 17.00     Pack years: 17.00     Types: Cigarettes   • Smokeless tobacco: Never Used   Substance and Sexual Activity   • Alcohol use: Not Currently     Comment: occ   • Drug use: Yes     Types: Inhaled     Comment: marijuana   • Sexual activity: Not on file       SURGICAL HISTORY  patient denies any surgical history    CURRENT MEDICATIONS  Home Medications     Reviewed by Bushra Marcos R.N. (Registered Nurse) on 01/13/20 at 1709  Med List Status: Complete   Medication Last Dose Status        Patient Adolfo Taking any Medications                       ALLERGIES  No Known Allergies    PHYSICAL EXAM  VITAL SIGNS: /91   Pulse 100   Temp 35.9 °C (96.6 °F) (Temporal)   Resp 16   Ht 1.702 m (5' 7\")   Wt 72.8 kg (160 lb 7.9 oz)   SpO2 97%   BMI 25.14 kg/m²    Genl: M sitting in bed comfortably, speaking clearly, " appears non toxic, but anxious in mild distress   Head: NC/AT   ENT: Mucous membranes moist, posterior pharynx clear, uvula midline, nares patent bilaterally   Eyes: Normal sclera, pupils equal round reactive to light  Neck: Supple, FROM, no LAD appreciated   Pulmonary: Lungs are clear to auscultation bilaterally  Chest: No TTP  CV:  Borderline tachycardic with regular rate, no murmur appreciated, pulses 2+ in both upper and lower extremities,  Abdomen: soft, NT/ND; no rebound/guarding, no masses palpated, no HSM   : no CVA or suprapubic tenderness   Musculoskeletal: Pain free ROM of the neck. Moving upper and lower extremities and spontaneous in coordinated fashion. Warm well perfused extremities.   Right Upper Extremity  - Skin: Single lesion on dorsum of right hand with surrounding induration, no pain with palpation.   - Motor: No pain with wrist flexion/extension/pronation/supanation. No pain at digits.  Right wrist is free with no pain with movement.  - Sensation intact to median/ulnar/radial nerves  - 2+ radial pulse, < 2 sec cap refill x 5 digits  Left Upper Extremity  - Skin:  2 forearm healing wounds on left side that have surrounding induration with open active drainage at wound site.  - Motor: No pain with wrist flexion/extension/pronation/supanation. No pain at digits.  - Sensation intact to median/ulnar/radial nerves  - 2+ radial pulse, < 2 sec cap refill x 5 digits  Psych: Patient has an appropriate affect and behavior  Skin: Bilateral upper extremities have several lesions in different states of healing.    COURSE & MEDICAL DECISION MAKING  Pertinent Labs & Imaging studies reviewed. (See chart for details)    Differential diagnoses include but not limited to: medication non-compliance, abscess, cellulitis, fasciatus, and myeolitis considered but unlikely.    5:44 PM - Patient seen and examined at bedside. Explained to patient the importance of keeping wound cleaned and covered at all times to  promote healing and to  antibiotic prescription as soon as possible to avoid spreading of infection into muscle or bone. Patient will be treated with Bactrim -160 mg and Keflex capsul 500 mg. Discussed discharging patient home with strict ED precautions. Patient verbalizes understanding and agreement to this plan of care.     Medical Decision Making:   Patient presents emergency room for symptoms as described above.  The patient has been noncompliant with oral antibiotic regimens and continues to have superficial cellulitis on the bilateral forearms.  He has no pain with movement of the joint surfaces and there does not appear to be any developing abscesses.  He has open wounds and excoriations that are likely self caused and inoculated.  There is no evidence of involvement of the digits, extensor tendons, or any articulation surfaces including the wrist and elbow.  With the low likelihood of acute joint involvement, no x-rays are obtained.  He is counseled about the importance of taking the antibiotic regimen and the first dose was provided here.  He has lost both of his previous prescriptions when they were printed and so these were sent electronically to his desired pharmacy.  Vital signs are rechecked and he has borderline tachycardia but remains afebrile and is otherwise in no acute distress.  He is not acutely septic, understands the working differential and the importance of follow-up care.  He will come back in 2 days for wound check or any worsening symptoms    The patient will return for worsening symptoms and is stable at the time of discharge. The patient verbalizes understanding and will comply.     DISPOSITION:  Patient will be discharged home in stable condition.    FOLLOW UP:  [facility], Emergency Dept  [address] [facility] [address] [phone]  In 2 days  If symptoms worsen, For wound re-check    [facility] [address]  [place] [address]  [phone]  Schedule an appointment as soon as possible for a visit       OUTPATIENT MEDICATIONS:  Discharge Medication List as of 1/13/2020  6:06 PM      START taking these medications    Details   sulfamethoxazole-trimethoprim (BACTRIM DS) 800-160 MG tablet Take 1 Tab by mouth 2 times a day for 7 days., Disp-14 Tab, R-0, Normal      cephALEXin (KEFLEX) 500 MG Cap Take 1 Cap by mouth 3 times a day for 7 days., Disp-21 Cap, R-0, Normal           FINAL IMPRESSION  Visit Diagnoses     ICD-10-CM   1. Cellulitis of upper extremity, unspecified laterality L03.119   2. Hand lesion L98.9     I, Clarissa Bailey (Severino), am scribing for, and in the presence of, Benedict Watson M.D..    Electronically signed by: Clarissa Braxton), 1/13/2020    IBenedict M.D. personally performed the services described in this documentation, as scribed by Clarissa Bailey in my presence, and it is both accurate and complete. E.    The note accurately reflects work and decisions made by me.  Benedict Watson M.D.  1/13/2020  7:00 PM

## 2020-01-14 NOTE — ED TRIAGE NOTES
Pt to ED with complaints of left wrist, right wrist, and left arm abscess with skin infection. He has open wounds to wrists and arm. No fever or chill. Was seen last week but never fill the prescriptions. + redness around left arm wound.     Pt educated on ED process and asked to wait in lobby. Patient educated on importance of alerting staff to new or worsening symptoms or concerns.

## 2020-02-28 ENCOUNTER — HOSPITAL ENCOUNTER (EMERGENCY)
Facility: MEDICAL CENTER | Age: 35
End: 2020-02-28
Attending: EMERGENCY MEDICINE
Payer: MEDICAID

## 2020-02-28 VITALS
TEMPERATURE: 98.1 F | DIASTOLIC BLOOD PRESSURE: 102 MMHG | HEART RATE: 117 BPM | WEIGHT: 162.92 LBS | BODY MASS INDEX: 25.57 KG/M2 | OXYGEN SATURATION: 98 % | HEIGHT: 67 IN | RESPIRATION RATE: 20 BRPM | SYSTOLIC BLOOD PRESSURE: 150 MMHG

## 2020-02-28 DIAGNOSIS — L03.317 CELLULITIS OF BUTTOCK: ICD-10-CM

## 2020-02-28 DIAGNOSIS — R21 RASH: ICD-10-CM

## 2020-02-28 PROCEDURE — 99282 EMERGENCY DEPT VISIT SF MDM: CPT

## 2020-02-28 RX ORDER — SULFAMETHOXAZOLE AND TRIMETHOPRIM 200; 40 MG/5ML; MG/5ML
20 SUSPENSION ORAL 2 TIMES DAILY
Qty: 280 ML | Refills: 0 | Status: SHIPPED | OUTPATIENT
Start: 2020-02-28 | End: 2020-03-06

## 2020-02-28 RX ORDER — CEPHALEXIN 250 MG/5ML
500 POWDER, FOR SUSPENSION ORAL 4 TIMES DAILY
Qty: 280 ML | Refills: 0 | Status: SHIPPED | OUTPATIENT
Start: 2020-02-28 | End: 2020-03-06

## 2020-02-28 RX ORDER — BENZOCAINE/MENTHOL 6 MG-10 MG
1 LOZENGE MUCOUS MEMBRANE 2 TIMES DAILY
Qty: 1 TUBE | Refills: 0 | Status: SHIPPED | OUTPATIENT
Start: 2020-02-28

## 2020-02-28 ASSESSMENT — FIBROSIS 4 INDEX: FIB4 SCORE: 0.33

## 2020-02-28 NOTE — ED TRIAGE NOTES
Chief Complaint   Patient presents with   • Rash   • Abscess     Pt ambulatory to triage with above complaint.  Pt states rash and bumps that began 1.5 months ago on anus.  Pt has raised blisters around anus.  Pt states he was previously seen at this facility for skin infection on arms and was given antibiotics which he finished last week.     Pt instructed to triage process and advised to alert staff of any changes.  Pt returned to [facility].  Pt states understanding.

## 2020-02-28 NOTE — ED PROVIDER NOTES
ED Provider Note    ED Provider Note      Primary care provider: Pcp Pt States None    CHIEF COMPLAINT  Chief Complaint   Patient presents with   • Rash   • Abscess       HPI  Sandeep Watson is a 34 y.o. male who presents to the Emergency Department with chief complaint of abscess.  Patient reports that he has had irritation in the perirectal rectal region for several months.  He reports that over the last couple days he has had swelling drainage worsening pain.  He reports pain is currently is an 8 out of 10 is worse with any defecation or cleansing himself is also worse when walking.  Minor chills no measured fever.  Minor nausea no vomiting states that he was prescribed antibiotics for this previously but states that he does not tolerate pills and anytime he tried to take the pill he vomited.  No headache altered mental status cough congestion chest pain shortness breath no other acute symptoms or concerns at this time.    REVIEW OF SYSTEMS  10 systems reviewed and otherwise negative, pertinent positives and negatives listed in the history of present illness.    PAST MEDICAL HISTORY   None    SURGICAL HISTORY  patient denies any surgical history    SOCIAL HISTORY  Social History     Tobacco Use   • Smoking status: Current Every Day Smoker     Packs/day: 1.00     Years: 17.00     Pack years: 17.00     Types: Cigarettes   • Smokeless tobacco: Never Used   Substance Use Topics   • Alcohol use: Not Currently     Comment: occ   • Drug use: Yes     Types: Inhaled     Comment: marijuana      Social History     Substance and Sexual Activity   Drug Use Yes   • Types: Inhaled    Comment: marijuana       FAMILY HISTORY  Non-Contributory    CURRENT MEDICATIONS  Home Medications     Reviewed by Manan Rivera R.N. (Registered Nurse) on 02/28/20 at 1339  Med List Status: Not Addressed   Medication Last Dose Status        Patient Adolfo Taking any Medications                       ALLERGIES  No Known Allergies    PHYSICAL  "EXAM  VITAL SIGNS: /102   Pulse (!) 117   Temp 36.7 °C (98.1 °F) (Oral)   Resp 20   Ht 1.702 m (5' 7\")   Wt 73.9 kg (162 lb 14.7 oz)   SpO2 98%   BMI 25.52 kg/m²   Pulse ox interpretation: I interpret this pulse ox as normal.  Constitutional: Alert and oriented x 3, minimal distress  HEENT: Atraumatic normocephalic, pupils are equal round reactive to light extraocular movements are intact. The nares is clear, external ears are normal, mouth shows moist mucous membranes  Neck: Supple, no JVD no tracheal deviation  Cardiovascular: Regular rate and rhythm no murmur rub or gallop 2+ pulses peripherally x4  Thorax & Lungs: No respiratory distress, no wheezes rales or rhonchi, No chest tenderness.   GI: Soft nontender nondistended positive bowel sounds, no peritoneal signs  : Deferred  Rectal: Patient has multiple areas of excoriation perirectally there is also what appears to be multiple thrombosed external hemorrhoids.  The areas of excoriation do have some papulosquamous raised lesions there is moderate surrounding erythema there is no fluctuance no drainage  Skin: Warm dry no acute rash or lesion  Musculoskeletal: Moving all extremities with full range and 5 of 5 strength, no acute  deformity  Neurologic: Cranial nerves III through XII are grossly intact, no sensory deficit, no cerebellar dysfunction   Psychiatric: Appropriate affect for situation at this time      DIAGNOSTIC STUDIES / PROCEDURES    COURSE & MEDICAL DECISION MAKING  Pertinent Labs & Imaging studies reviewed. (See chart for details)    2:03 PM - Patient seen and examined at bedside.       Patient noted to have slightly elevated blood pressure likely circumstantial secondary to presenting complaint. Referred to primary care physician for further evaluation.        Medical Decision Making: Patient has what appears to be excoriated hemorrhoids however there are multiple lesions in his perirectal region that I discussed with patient " concerning for possible human papilloma virus/condyloma acuminata.  The acute situation at hand is that these are likely infected at this time and patient is been noncompliant with previous antibiotic therapies.  Patient is prescribed both Keflex and Bactrim both oral suspension for compliance he is also given a prescription for hydrocortisone cream to be applied to the area twice daily and encouraged to take proper hygiene bathe daily as available.  Return for worsening pain redness swelling discharge drainage otherwise follow-up with primary care in 1 week for reevaluation discharged in stable and improved condition.        [facility]  [address] [address]  [phone]  Schedule an appointment as soon as possible for a visit   for establishment of primary care, for blood pressure management    [facility], Emergency Dept  [address] [facility] [address] [phone]    immediately if symptoms worsen      Discharge Medication List as of 2/28/2020  2:24 PM      START taking these medications    Details   cephALEXin (KEFLEX) 250 MG/5ML Recon Susp Take 10 mL by mouth 4 times a day for 7 days., Disp-280 mL, R-0, Print Rx Paper      sulfamethoxazole-trimethoprim 200-40 mg/5 mL (BACTRIM/SEPTRA) 200-40 MG/5ML Suspension Take 20 mL by mouth 2 times a day for 7 days., Disp-280 mL, R-0, Print Rx Paper      hydrocortisone 1 % Cream Apply 1 Each to affected area(s) 2 times a day., Disp-1 Tube, R-0, Print Rx Paper             FINAL IMPRESSION  1. Cellulitis of buttock Active   2. Rash Active   3.  Possible condyloma acuminata      This dictation has been created using voice recognition software and/or scribes. The accuracy of the dictation is limited by the abilities of the software and the expertise of the scribes. I expect there may be some errors of grammar and possibly content. I made every attempt to manually correct the errors within my dictation. However, errors  related to voice recognition software and/or scribes may still exist and should be interpreted within the appropriate context.

## 2020-03-17 ENCOUNTER — HOSPITAL ENCOUNTER (EMERGENCY)
Facility: MEDICAL CENTER | Age: 35
End: 2020-03-17
Attending: EMERGENCY MEDICINE
Payer: MEDICAID

## 2020-03-17 VITALS
TEMPERATURE: 97.5 F | BODY MASS INDEX: 25.26 KG/M2 | HEIGHT: 68 IN | DIASTOLIC BLOOD PRESSURE: 98 MMHG | RESPIRATION RATE: 18 BRPM | WEIGHT: 166.67 LBS | HEART RATE: 105 BPM | SYSTOLIC BLOOD PRESSURE: 152 MMHG | OXYGEN SATURATION: 96 %

## 2020-03-17 DIAGNOSIS — L98.9 SKIN LESIONS: ICD-10-CM

## 2020-03-17 DIAGNOSIS — A53.9 SYPHILIS: ICD-10-CM

## 2020-03-17 LAB
ALBUMIN SERPL BCP-MCNC: 4.2 G/DL (ref 3.2–4.9)
ALBUMIN/GLOB SERPL: 1.3 G/DL
ALP SERPL-CCNC: 85 U/L (ref 30–99)
ALT SERPL-CCNC: 20 U/L (ref 2–50)
ANION GAP SERPL CALC-SCNC: 10 MMOL/L (ref 7–16)
AST SERPL-CCNC: 22 U/L (ref 12–45)
BASOPHILS # BLD AUTO: 0.6 % (ref 0–1.8)
BASOPHILS # BLD: 0.06 K/UL (ref 0–0.12)
BILIRUB SERPL-MCNC: 0.5 MG/DL (ref 0.1–1.5)
BUN SERPL-MCNC: 14 MG/DL (ref 8–22)
CALCIUM SERPL-MCNC: 10.2 MG/DL (ref 8.5–10.5)
CHLORIDE SERPL-SCNC: 102 MMOL/L (ref 96–112)
CO2 SERPL-SCNC: 23 MMOL/L (ref 20–33)
CREAT SERPL-MCNC: 1.04 MG/DL (ref 0.5–1.4)
EOSINOPHIL # BLD AUTO: 0.04 K/UL (ref 0–0.51)
EOSINOPHIL NFR BLD: 0.4 % (ref 0–6.9)
ERYTHROCYTE [DISTWIDTH] IN BLOOD BY AUTOMATED COUNT: 43.9 FL (ref 35.9–50)
GLOBULIN SER CALC-MCNC: 3.3 G/DL (ref 1.9–3.5)
GLUCOSE SERPL-MCNC: 150 MG/DL (ref 65–99)
HCT VFR BLD AUTO: 45.8 % (ref 42–52)
HGB BLD-MCNC: 15 G/DL (ref 14–18)
HIV 1+2 AB+HIV1 P24 AG SERPL QL IA: NORMAL
IMM GRANULOCYTES # BLD AUTO: 0.04 K/UL (ref 0–0.11)
IMM GRANULOCYTES NFR BLD AUTO: 0.4 % (ref 0–0.9)
LYMPHOCYTES # BLD AUTO: 2.41 K/UL (ref 1–4.8)
LYMPHOCYTES NFR BLD: 24.2 % (ref 22–41)
MCH RBC QN AUTO: 27.8 PG (ref 27–33)
MCHC RBC AUTO-ENTMCNC: 32.8 G/DL (ref 33.7–35.3)
MCV RBC AUTO: 84.8 FL (ref 81.4–97.8)
MONOCYTES # BLD AUTO: 0.55 K/UL (ref 0–0.85)
MONOCYTES NFR BLD AUTO: 5.5 % (ref 0–13.4)
NEUTROPHILS # BLD AUTO: 6.85 K/UL (ref 1.82–7.42)
NEUTROPHILS NFR BLD: 68.9 % (ref 44–72)
NRBC # BLD AUTO: 0 K/UL
NRBC BLD-RTO: 0 /100 WBC
PLATELET # BLD AUTO: 419 K/UL (ref 164–446)
PMV BLD AUTO: 7.8 FL (ref 9–12.9)
POTASSIUM SERPL-SCNC: 3.7 MMOL/L (ref 3.6–5.5)
PROT SERPL-MCNC: 7.5 G/DL (ref 6–8.2)
RBC # BLD AUTO: 5.4 M/UL (ref 4.7–6.1)
RPR SER QL: REACTIVE
RPR SER-TITR: NORMAL {TITER}
SODIUM SERPL-SCNC: 135 MMOL/L (ref 135–145)
TREPONEMA PALLIDUM IGG+IGM AB [PRESENCE] IN SERUM OR PLASMA BY IMMUNOASSAY: REACTIVE
WBC # BLD AUTO: 10 K/UL (ref 4.8–10.8)

## 2020-03-17 PROCEDURE — 85025 COMPLETE CBC W/AUTO DIFF WBC: CPT

## 2020-03-17 PROCEDURE — 80053 COMPREHEN METABOLIC PANEL: CPT

## 2020-03-17 PROCEDURE — 87389 HIV-1 AG W/HIV-1&-2 AB AG IA: CPT

## 2020-03-17 PROCEDURE — 700111 HCHG RX REV CODE 636 W/ 250 OVERRIDE (IP): Performed by: EMERGENCY MEDICINE

## 2020-03-17 PROCEDURE — 86593 SYPHILIS TEST NON-TREP QUANT: CPT

## 2020-03-17 PROCEDURE — 86592 SYPHILIS TEST NON-TREP QUAL: CPT

## 2020-03-17 PROCEDURE — A9270 NON-COVERED ITEM OR SERVICE: HCPCS | Performed by: EMERGENCY MEDICINE

## 2020-03-17 PROCEDURE — 700102 HCHG RX REV CODE 250 W/ 637 OVERRIDE(OP): Performed by: EMERGENCY MEDICINE

## 2020-03-17 PROCEDURE — 99284 EMERGENCY DEPT VISIT MOD MDM: CPT

## 2020-03-17 PROCEDURE — 86780 TREPONEMA PALLIDUM: CPT

## 2020-03-17 PROCEDURE — 96372 THER/PROPH/DIAG INJ SC/IM: CPT

## 2020-03-17 RX ORDER — NYSTATIN 100000 [USP'U]/G
100 POWDER TOPICAL 4 TIMES DAILY
Qty: 0.028 G | Refills: 0 | Status: SHIPPED | OUTPATIENT
Start: 2020-03-17 | End: 2020-03-24

## 2020-03-17 RX ORDER — NYSTATIN 100000 [USP'U]/G
POWDER TOPICAL ONCE
Status: COMPLETED | OUTPATIENT
Start: 2020-03-17 | End: 2020-03-17

## 2020-03-17 RX ADMIN — PENICILLIN G BENZATHINE 2.4 MILLION UNITS: 1200000 INJECTION, SUSPENSION INTRAMUSCULAR at 12:27

## 2020-03-17 RX ADMIN — NYSTATIN: 100000 POWDER TOPICAL at 11:45

## 2020-03-17 ASSESSMENT — FIBROSIS 4 INDEX: FIB4 SCORE: 0.33

## 2020-03-17 NOTE — ED NOTES
Discharge instructions given to pt including follow up with pcp or returning if no improvement of symptoms or to return if worse. Prescription x 1 provided to pt. Questions answered by RN. Denies any new complaints. Discharged w/stable vitals and able to ambulates w/steady gait.

## 2020-03-17 NOTE — ED PROVIDER NOTES
ED Provider Note    Chief Complaint:   Perirectal rash    HPI:  Sandeep Watson is a 34 y.o. male who presents with chief complaint of a perirectal rash.  He was seen for this same symptom last month, states that his symptoms have been present for 4 to 6 weeks, progressively worsening.  He describes a mild painful rash that progressively worsened, and developed into weeping and draining skin lesions.  He was seen in this emergency department, physical exam notes that symptoms were consistent with excoriated external hemorrhoids, and possibly condyloma acuminata or HPV.  He was prescribed antibiotics, took this medication without any significant improvement.  He has been using a lidocaine hemorrhoid cream, and states this does help his pain, though pain and drainage has worsened since that time.  He denies any associated fevers, states he does have some pain with defecation he reports increasingly worsening drainage.    He is had no associated nausea or vomiting.  He has had no recent respiratory symptoms.  He is homeless, was counseled to follow-up with his primary care physician, however he did not schedule a follow-up appointment after his discharge from the emergency department.    Review of Systems:  See HPI for pertinent positives and negatives. All other systems negative.    Past Medical History:       Social History:  Social History     Tobacco Use   • Smoking status: Current Every Day Smoker     Packs/day: 1.00     Years: 17.00     Pack years: 17.00     Types: Cigarettes   • Smokeless tobacco: Never Used   Substance and Sexual Activity   • Alcohol use: Not Currently     Comment: occ   • Drug use: Yes     Types: Inhaled     Comment: marijuana   • Sexual activity: Not on file       Surgical History:  patient denies any surgical history    Current Medications:  Home Medications    **Home medications have not yet been reviewed for this encounter**         Allergies:  No Known Allergies    Physical Exam:  Vital  "Signs: /98   Pulse (!) 105   Temp 36.4 °C (97.5 °F) (Oral)   Resp 18   Ht 1.727 m (5' 8\")   Wt 75.6 kg (166 lb 10.7 oz)   SpO2 96%   BMI 25.34 kg/m²   Constitutional: Alert, no acute distress  HENT: Atraumatic, normocephalic  Eyes: Pupils equal and reactive, normal conjunctiva  Neck: Supple, normal range of motion, no stridor  Cardiovascular: Extremities are warm and well perfused  Pulmonary: No respiratory distress, normal work of breathing  Abdomen: Soft, non-distended, non-tender to palpation  Skin: Warm, dry, no rashes or lesions, excepting as documented in  exam  : Skin surrounding the rectal area is raw and weeping, does appear to be raised with likely underlying pathology, including malignancy, HPV, or condyloma.  No single painless lesion identified.  No areas of fluctuance.  Musculoskeletal: Normal range of motion in all extremities, no swelling or deformity noted  Neurologic: Alert, oriented, normal speech, normal motor function  Psychiatric: Normal and appropriate mood and affect    Medical records reviewed for continuity of care.  Patient was seen in this emergency department 2/28/2020 with chief complaint of perirectal abscess.  Physical exam noted what appeared to be thrombosed external hemorrhoids.  Multiple lesions noted in the perirectal region that may be HPV or condyloma acuminata.  Noted that they are likely infected, patient has not been adherent to prior antibiotic therapies.  He was prescribed Keflex and Bactrim as well as hydrocortisone cream.  Discharged home with instructions to follow-up with primary care.    Labs:  Labs Reviewed   CBC WITH DIFFERENTIAL - Abnormal; Notable for the following components:       Result Value    MCHC 32.8 (*)     MPV 7.8 (*)     All other components within normal limits   COMP METABOLIC PANEL - Abnormal; Notable for the following components:    Glucose 150 (*)     All other components within normal limits   T.PALLIDUM AB EIA - Abnormal; Notable " for the following components:    Syphilis, Treponemal Qual Reactive (*)     All other components within normal limits   HIV AG/AB COMBO ASSAY SCREENING   ESTIMATED GFR   RPR (SYPHILIS)       Radiology:  No orders to display        ED Medications Administered:  Medications   nystatin (MYCOSTATIN) powder ( Topical Given 3/17/20 1145)   penicillin G benzathine (BICILLIN-LA) injection 2.4 Million Units (2.4 Million Units Intramuscular Given 3/17/20 1227)       Differential diagnosis:  Malignancy, infected skin lesion, opportunistic infection, malignant    MDM:  Patient presents with progressively worsening pain to the perirectal area with associated underlying skin changes.  Symptoms look as though they may be consistent with an associated fungal or yeast infection, I do believe there is an underlying lesion that requires subspecialty evaluation.     On laboratory evaluation CBC and CMP are reassuring.  I did have some concern with the appearance of the rash, that underlying pathology may represent an opportunistic infection.  For this reason screening HIV was ordered.  Symptoms did not look consistent with a treponemal rash, but RPR was ordered due to possibility of sexually transmitted HPV.  Patient was discharged with HIV and RPR pending.  Uncertain as to how reliable this patient would be for follow-up, he was treated with penicillin prior to discharge with pending RPR.     Plan at this time is for treatment of presumed fungal infection with close outpatient follow-up.  Patient was provided with to community clinics, counseled to call the clinics to schedule a follow-up appointment for complete recheck.  If his symptoms do not improve in 2 to 3 days, or if they worsen, he will return to the emergency department for recheck if unable to follow-up with primary care.  I did  him to contact the clinics, schedule appointments even if they are far in the future, and bring that information with him if he does have  to return to the emergency department for recheck.    Blood pressure today is greater than 120/80, patient is instructed to follow up with primary care provider for blood pressure recheck.    Disposition:  Discharge home in stable condition    Final Impression:  1. Skin lesions    2. Syphilis        Electronically signed by: Cassy Concepcion MD, 3/17/2020 2:47 PM         No

## 2020-03-17 NOTE — ED TRIAGE NOTES
Amb to triage w/ c/o multiple painful abscesses to buttocks x 2 months.  Pt was seen here 2/28 for the same, completed his px of antibiotics, reports symptoms are worsening.  Reports + drainage from abscess.

## 2020-03-17 NOTE — DISCHARGE INSTRUCTIONS
Please call the clinics above for a follow-up visit.  Please use the powder as prescribed on the affected area.  If you do not see any improvement in 2 to 3 days, and you are not able to schedule a close follow-up appointment, please return to the emergency department for recheck.  Please bring the name of the clinic with you, as well as the time that they told her they would be able to schedule an appointment.  If neither clinic is able to schedule a follow-up appointment, please bring the name of the person you spoke with to the emergency department.

## 2020-03-17 NOTE — ED NOTES
Ambulates to room w/steady gait. Pt changing into hospital gown. Call light within reach. Instructed to notify staff for any assistance.

## 2020-03-18 NOTE — ED NOTES
"ED Positive Culture Follow-up/Notification Note:    Date: 3/18/20     Patient seen in the ED on 3/17/2020 for perirectal rash. The pt was seen for the same complaint the prior month, and he reported that symptoms had been present for about 4-6 weeks. At prior visit he was noted to have excoriated external hemorrhoids and possible condyloma acuminata or HPV. He given prescriptions for keflex, bactrim, and hydrocortisone cream.     Physical exam at this ED visit noted skin surrounding the rectal area was raw and weeping, appeared to be raised with likely underlying pathology, including malignancy, HPV, or condyloma.  No single painless lesion identified.  No areas of fluctuance.    1. Skin lesions    2. Syphilis       Discharge Medication List as of 3/17/2020 12:42 PM      START taking these medications    Details   nystatin (MYCOSTATIN) powder Apply 0.001 g to affected area(s) 4 times a day for 7 days., Disp-0.028 g, R-0, Print Rx Paper           Medications given in the ED:  -Nystatin powder  -Penicillin G benzathine (Bicillin LA) 2.4 million units IM x 1    Allergies: Patient has no known allergies.     Vitals:    03/17/20 0952 03/17/20 0954 03/17/20 1202   BP: 145/102  152/98   Pulse: (!) 106  (!) 105   Resp: 18     Temp: 36.4 °C (97.5 °F)     TempSrc: Oral     SpO2: 97%  96%   Weight:  75.6 kg (166 lb 10.7 oz)    Height: 1.727 m (5' 8\")         Final cultures:   Results     ** No results found for the last 168 hours. **          Plan:   The pt was treated for syphilis in the ED. He was given a prescription for nystatin powder on discharge. The presence of a rash could indicate secondary syphilis.     I called and informed the pt of results (syphilis and HIV). He reported that he filled nystatin powder, but that his symptoms had not improved. I counseled him to abstain from intercourse for at least 7 days from treatment, to use a barrier method such as condoms if he has intercourse, to notify any partners in the " last 90 days so that they can be treated, and that he will need f/u testing in 6 and 12 months to ensure he was treated adequately. I advised him that the [facility] will [facility] out to him, and I provided him with the appt line phone number so that he can f/u with them. I advised him to establish care with a primary provider as instructed by ERP.     I counseled him to return to the ED if his symptoms worsen and/or if he develops any fevers. The pt stated understanding and had no further questions.       Remberto Trejo, PharmD

## 2020-03-27 ENCOUNTER — DOCUMENTATION (OUTPATIENT)
Dept: HEALTH INFORMATION MANAGEMENT | Facility: OTHER | Age: 35
End: 2020-03-27